# Patient Record
Sex: MALE | Race: OTHER
[De-identification: names, ages, dates, MRNs, and addresses within clinical notes are randomized per-mention and may not be internally consistent; named-entity substitution may affect disease eponyms.]

---

## 2018-11-30 ENCOUNTER — HOSPITAL ENCOUNTER (OUTPATIENT)
Dept: HOSPITAL 61 - PNCL | Age: 43
Discharge: HOME | End: 2018-11-30
Attending: ANESTHESIOLOGY
Payer: COMMERCIAL

## 2018-11-30 DIAGNOSIS — Z87.891: ICD-10-CM

## 2018-11-30 DIAGNOSIS — Z88.5: ICD-10-CM

## 2018-11-30 DIAGNOSIS — Z98.890: ICD-10-CM

## 2018-11-30 DIAGNOSIS — Z79.899: ICD-10-CM

## 2018-11-30 DIAGNOSIS — M51.16: Primary | ICD-10-CM

## 2018-11-30 DIAGNOSIS — M96.1: ICD-10-CM

## 2018-11-30 DIAGNOSIS — I10: ICD-10-CM

## 2018-11-30 DIAGNOSIS — Z72.89: ICD-10-CM

## 2018-11-30 PROCEDURE — 62323 NJX INTERLAMINAR LMBR/SAC: CPT

## 2018-11-30 NOTE — PAIN
DATE OF SERVICE:  11/30/2018



Initial Consultation for Pain Clinic



CHIEF COMPLAINT:  Low back and right lower extremity pain.



HISTORY OF PRESENT ILLNESS:  This is a 43-year-old male who presents with

history of pain for about 4 years status post lumbar laminectomy on the right at

L4-L5 in 2011.  The patient reports he did well initially, but after about 2

years or so, the pain began to return.  Over the past 4 years, it has been

significantly increasing.  Especially, over the last 1 year, the patient reports

increasing low back, right lower extremity, mostly in the lateral aspect of the

thigh and leg, sometimes on the left, but mostly on the right side, posterior

gluteus, posterior thigh and calf at times, as well as the medial thigh and calf

on the right.  The patient reports across the low back bilaterally when he is on

his feet for more than just a few hours.  The patient does have a job where he

is on his feet most of his working day.  The patient reports the pain is

constant, sharp, stabbing, throbbing, shooting, radiating into the right lower

extremity, worse with activity, standing, walking, changing positions, also

burning and aching with prolonged sitting.  The patient reports it awakes him

from sleep occasionally, but not every night, does not affect bowel or bladder

control, but does affect his ability to walk, not using any assistive devices,

however.  The patient has had previous chiropractic treatment, as well as

exercise, which he is currently doing and had some epidural injections in 2013,

which he reports did help.  The patient is taking oxycodone, which has not been

helping.  The patient reports disability rate from 0 to 10, 10 being the worst,

average is 10 with family and home responsibilities, recreation, social

activity, occupation and self-care, 7 with life support activities, and 0 with

sexual behavior.  The patient did have MRI scan was from 05/28/2016 showing

stable postoperative changes from right, previous laminectomy at L4-L5 with

small stable recurrent central disk protrusion impressing slightly upon the

ventral thecal sac and stable small broad-based disk bulge at L3-L4 and small

central right paracentral disk osteophyte complex at L5-S1 resulting in mild

right lateral recess narrowing, again it is from 05/28/2016.



PAST MEDICAL HISTORY:  Significant for hypertension; cigarette smoking, one-half

pack a day for 20 years, but quit 10/01/2018.  Otherwise, the patient has been

in fairly good health.



PREVIOUS SURGERIES:  Include lumbar laminectomy in 2011 and rotator cuff surgery

on the left in 2001.



CURRENT MEDICATIONS:  Include zolpidem, omega fish oils, Percocet, and vitamin

C.



ALLERGIES:  The patient is allergic to HYDROCODONE.



FAMILY HISTORY:  Significant for no major medical problems or conditions he is

aware of.



SOCIAL HISTORY:  The patient just recently quit smoking.  Does not drink

alcohol.  Does not use any illegal, illicit or recreational drugs.  He is

 and lives with his spouse, lives locally in [place], [place].  Works

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

his report.



REVIEW OF SYSTEMS:  The patient's review of systems is positive for those items

mentioned in history of present illness.  All systems reviewed and otherwise

negative.  It is complete, full and well documented on the patient's chart.



PHYSICAL EXAMINATION:

VITAL SIGNS:  The patient's blood pressure is 138/78, pulse 72, respirations 18,

temperature is 98 degrees Fahrenheit, height is 5 feet 6 inches, weight is 228

pounds.

GENERAL:  The patient is awake, alert, oriented, appropriate, very pleasant

demeanor.

HEENT:  Head is normocephalic, atraumatic.  Extraocular movements intact and

symmetrical.  Oral cavity:  Mucous membranes moist and pink.  Dentition is

intact.

NECK:  Shows anterior throat supple without palpable lymphadenopathy noted. 

Swallow reflex is symmetrical.

CHEST:  Shows normal on inspection.  Breath sounds are clear to auscultation

bilaterally.

HEART:  Shows S1, S2 clear.  No murmurs auscultated.

ABDOMEN:  Soft, obese, nontender, nondistended.  No palpable organomegaly is

noted.  No rebound or guarding demonstrated.

BACK:  Shows spine grossly in the midline, normal-appearing cervical lordotic

curvature, thoracic kyphotic curvature, and mild flattening of lumbar lordotic

curvature.  Neck shows full rotational motion of cervical spine, both laterally,

as well as extension and flexion without difficulty.  Lumbar spine shows

symmetrical paraspinous musculature without atrophy, hypertrophy with palpation,

shows some moderate tenderness in the middle and lower distribution of

paraspinous muscles, but only diffusely without radiation and symmetrical,

without trigger points.  No tenderness over the spinous processes, sacrum or

sacroiliac regions.  The patient has good rotational motion of lumbar spine,

both laterally greater than 10 degrees right and left, as well as extension

greater than 10 degrees, forward flexion 45 degrees without significant pain

reported.

EXTREMITIES:  Lower extremities show deep tendon reflexes at 2+ in the patellar,

1+ tendo calcaneus tendons.  Motor exam is strong with 5/5 dorsiflexion and

extension bilaterally.  The patient has a mild straight leg raise on the right

about 45 degrees, but is decreased with knee flexion.  Left side is negative. 

Gaenslen's and Dio's maneuvers are negative bilaterally as well.  Peripheral

pulses are 1+ posterior tibial.  No peripheral edema is noted.  Lower

extremities are warm and dry to touch, equal in color and appearance.  The

patient is able to stand, stand on his toes without difficulty or loss of

balance, has a normal-appearing gait for short distance walk in the office

today, not using any assistive devices.

SKIN:  Shows warm and dry, good turgor.  No edema.  No sores, rashes or

bruising.



IMPRESSION:

1.  This is a 43-year-old male with long history about 4 years, worse over the

past 1 year, low back pain in the radicular fashion in the L5-S1 dermatomal

distribution of the right side, status post exercise and physical therapies,

previous interventional techniques.

2.  MRI scan of lumbar spine as noted again from 2016.

3.  Hypertension.



PLAN:  Options were discussed with the patient including conservative medical

management, physical therapy, interventional techniques and would like to pursue

interventional techniques.  We discussed lumbar epidural steroid injection using

description, as well as anatomical models to describe the procedure.  Risks were

then discussed including, but not limited to bleeding, infection, possibility of

epidural hematoma, subsequent neurologic compromise, dural puncture, headaches,

spinal cord and/or nerve damage, side effects of steroid medication and poor

results regarding pain control.  The patient understands and wished to proceed. 

The patient to return to clinic in approximately 2 weeks for followup.  He was

counseled to return appointment, activity level and side effects to be aware of.



DIAGNOSES:  Lumbar radiculopathy with lumbar degenerative disk disease,

post-lumbar laminectomy syndrome.



PROCEDURE:  Lumbar epidural steroid injection, translaminar approach at L5-S1

level using C-arm fluoroscopic guidance under sterile prep and drape using local

anesthetic.



MEDICATIONS:  A total of 120 mg of Depo-Medrol plus 10 mL of preservative-free

normal saline and 2 mL of Isovue for contrast.



CONDITION AT DISCHARGE:  Stable.  The patient tolerated the procedure well, had

no complications.

 



______________________________

ANAMIKA DALTON MD



DR:  IRVING/mira  JOB#:  [id number] / [id number]

DD:  11/30/2018 13:24  DT:  11/30/2018 22:22